# Patient Record
Sex: FEMALE | Employment: OTHER | URBAN - METROPOLITAN AREA
[De-identification: names, ages, dates, MRNs, and addresses within clinical notes are randomized per-mention and may not be internally consistent; named-entity substitution may affect disease eponyms.]

---

## 2019-04-15 ENCOUNTER — CONSULTATION (OUTPATIENT)
Dept: URBAN - METROPOLITAN AREA CLINIC 11 | Facility: CLINIC | Age: 49
End: 2019-04-15

## 2019-04-15 VITALS — DIASTOLIC BLOOD PRESSURE: 84 MMHG | SYSTOLIC BLOOD PRESSURE: 120 MMHG | HEIGHT: 55 IN

## 2019-04-15 ASSESSMENT — VISUAL ACUITY
OS_SC: 20/25-2
OD_SC: 20/25-2

## 2019-04-15 ASSESSMENT — TONOMETRY
OD_IOP_MMHG: 16
OS_IOP_MMHG: 16

## 2019-04-15 NOTE — PATIENT DISCUSSION
Patient is bothered by her vitreous debris in her left eye.  Discussed membranes and strands with patient. Offered PPV and discussed vitrectomy procedure with patient.  She desires to proceed with surgery.  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  Patient desires to proceed.  This will be scheduled in the near future.

## 2019-12-03 ENCOUNTER — FOLLOW UP (OUTPATIENT)
Dept: URBAN - METROPOLITAN AREA CLINIC 11 | Facility: CLINIC | Age: 49
End: 2019-12-03

## 2019-12-03 ASSESSMENT — TONOMETRY: OD_IOP_MMHG: 14

## 2019-12-03 ASSESSMENT — VISUAL ACUITY
OD_PH: 20/30
OD_SC: 20/100
OS_SC: 20/30-2

## 2019-12-03 NOTE — PATIENT DISCUSSION
I have reassured her that there are no holes or tears seen on careful exam. She has become symptomatic recently. I have also noted that the cataract in the right eye has progressed and have asked her to see Dr. Gisel Lang in the near future. Please call our mutual patient to schedule an appointment at your convenience. She can be reached at [phone].